# Patient Record
Sex: MALE | Race: WHITE
[De-identification: names, ages, dates, MRNs, and addresses within clinical notes are randomized per-mention and may not be internally consistent; named-entity substitution may affect disease eponyms.]

---

## 2022-07-30 ENCOUNTER — HOSPITAL ENCOUNTER (EMERGENCY)
Dept: HOSPITAL 12 - ER | Age: 77
Discharge: HOME | End: 2022-07-30
Payer: MEDICARE

## 2022-07-30 VITALS — BODY MASS INDEX: 24.25 KG/M2 | HEIGHT: 68 IN | WEIGHT: 160 LBS

## 2022-07-30 VITALS — DIASTOLIC BLOOD PRESSURE: 94 MMHG | SYSTOLIC BLOOD PRESSURE: 169 MMHG
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
## 2022-07-30 DIAGNOSIS — G40.909: ICD-10-CM

## 2022-07-30 DIAGNOSIS — E78.00: ICD-10-CM

## 2022-07-30 DIAGNOSIS — S01.81XA: Primary | ICD-10-CM

## 2022-07-30 DIAGNOSIS — S02.2XXA: ICD-10-CM

## 2022-07-30 DIAGNOSIS — Z79.899: ICD-10-CM
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
## 2022-07-30 NOTE — NUR
patient ia alert and oriented times 4. Bacitracin ointment applied to sutered 
site. Pt refused to have the wound covered. Wound without drainage.Pt denies 
pain. No complaint. D/C'd home with significant other.

## 2022-07-30 NOTE — NUR
PATIENT IS AWQAKE ALERT AND ORIENTED TIMES FOUR. PT DENIES PAIN OR HEADACHE. 
LACERATION TRAY SET UP AT THE BEDSIDE IN ANTICIPATION OF REPAIR OF LACERATION..